# Patient Record
Sex: MALE | Race: WHITE | NOT HISPANIC OR LATINO
[De-identification: names, ages, dates, MRNs, and addresses within clinical notes are randomized per-mention and may not be internally consistent; named-entity substitution may affect disease eponyms.]

---

## 2019-04-15 ENCOUNTER — APPOINTMENT (OUTPATIENT)
Dept: RHEUMATOLOGY | Facility: CLINIC | Age: 59
End: 2019-04-15
Payer: COMMERCIAL

## 2019-04-15 VITALS
WEIGHT: 200 LBS | TEMPERATURE: 98.7 F | SYSTOLIC BLOOD PRESSURE: 130 MMHG | DIASTOLIC BLOOD PRESSURE: 78 MMHG | HEART RATE: 79 BPM | OXYGEN SATURATION: 98 % | HEIGHT: 69 IN | BODY MASS INDEX: 29.62 KG/M2 | RESPIRATION RATE: 17 BRPM

## 2019-04-15 DIAGNOSIS — Z82.61 FAMILY HISTORY OF ARTHRITIS: ICD-10-CM

## 2019-04-15 DIAGNOSIS — Z87.891 PERSONAL HISTORY OF NICOTINE DEPENDENCE: ICD-10-CM

## 2019-04-15 DIAGNOSIS — Z86.79 PERSONAL HISTORY OF OTHER DISEASES OF THE CIRCULATORY SYSTEM: ICD-10-CM

## 2019-04-15 PROBLEM — Z00.00 ENCOUNTER FOR PREVENTIVE HEALTH EXAMINATION: Status: ACTIVE | Noted: 2019-04-15

## 2019-04-15 PROCEDURE — 99205 OFFICE O/P NEW HI 60 MIN: CPT

## 2019-04-15 RX ORDER — LOSARTAN POTASSIUM 25 MG/1
25 TABLET, FILM COATED ORAL
Qty: 30 | Refills: 0 | Status: COMPLETED | COMMUNITY
Start: 2018-12-07

## 2019-04-15 RX ORDER — LOSARTAN POTASSIUM 100 MG/1
100 TABLET, FILM COATED ORAL
Qty: 90 | Refills: 0 | Status: ACTIVE | COMMUNITY
Start: 2019-04-05

## 2019-04-15 RX ORDER — VILAZODONE HYDROCHLORIDE 10 MG/1
10 TABLET ORAL
Qty: 90 | Refills: 0 | Status: ACTIVE | COMMUNITY
Start: 2019-03-22

## 2019-04-15 RX ORDER — ZOLPIDEM TARTRATE 5 MG/1
5 TABLET ORAL
Qty: 30 | Refills: 0 | Status: ACTIVE | COMMUNITY
Start: 2019-03-22

## 2019-04-15 RX ORDER — LOSARTAN POTASSIUM 50 MG/1
50 TABLET, FILM COATED ORAL
Qty: 30 | Refills: 0 | Status: COMPLETED | COMMUNITY
Start: 2019-01-04

## 2019-04-15 RX ORDER — AMLODIPINE BESYLATE 10 MG/1
10 TABLET ORAL
Qty: 30 | Refills: 0 | Status: ACTIVE | COMMUNITY
Start: 2018-10-18

## 2019-04-15 NOTE — HISTORY OF PRESENT ILLNESS
[FreeTextEntry1] : 58 year old male with PMHx as listed below reports that for the past 6-8 months, he has been experiencing pain and stiffness in his neck.  The pain is constant, though worse with activity and better at rest.  He describes the pain as dull, 5 out of 10.  He denies any radiation.   No weakness/numbness/tingling in any of his extremities.   No AM stiffness.  He gets relief from ibuprofen.\par Soon after the neck pain, he began to experience blurry vision and frequent headaches.  He initially assumed that he was having sinus headaches.  However, the symptoms worsened, and he began to feel like his eyes were "straining".  He saw his PMD, who referred him to ophthalmology, who diagnosed him with iritis.  He was started on prednisolone eye drops and was referred here.\par No F/C, no unintentional weight loss, no night sweats, no oral ulcers, no rashes, no joint pains, no alopecia, no photosensitivity, no dry eyes/dry mouth, no Raynaud symptoms, no focal weakness, no dysphagia\par No personal or family history of psoriasis, no hx of dactylitis, no back pain, no IBD symptoms, no preceding GI/ infection\par \par  [Weight Loss] : no weight loss [Anorexia] : no anorexia [Malaise] : no malaise [Fever] : no fever [Malar Facial Rash] : no malar facial rash [Chills] : no chills [Skin Nodules] : no skin nodules [Skin Lesions] : no lesions [Oral Ulcers] : no oral ulcers [Dry Mouth] : no dry mouth [Cough] : no cough [Dysphagia] : no dysphagia [Shortness of Breath] : no shortness of breath [Chest Pain] : no chest pain [Arthralgias] : no arthralgias [Joint Warmth] : no joint warmth [Joint Swelling] : no joint swelling [Joint Deformity] : no joint deformity [Decreased ROM] : no decreased range of motion [Falls] : no falls [Morning Stiffness] : no morning stiffness [Dyspnea] : no dyspnea [Muscle Weakness] : no muscle weakness [Myalgias] : no myalgias [Muscle Spasms] : no muscle spasms [Muscle Cramping] : no muscle cramping [Eye Pain] : no eye pain [Visual Changes] : no visual changes [Eye Redness] : no eye redness [Dry Eyes] : no dry eyes

## 2019-04-15 NOTE — CONSULT LETTER
[Dear  ___] : Dear  [unfilled], [Please see my note below.] : Please see my note below. [Consult Letter:] : I had the pleasure of evaluating your patient, [unfilled]. [Consult Closing:] : Thank you very much for allowing me to participate in the care of this patient.  If you have any questions, please do not hesitate to contact me. [Sincerely,] : Sincerely, [FreeTextEntry3] : Kendall Anderson MD\par Rheumatology\par [facility]\par  of Medicine\par [employer] [facility] \par \par [address]\[place] [address]\par phone:  [phone]\par fax:      [phone]\par \par [address] [place] [address]\[place] [address]\par phone:  [phone]\par fax:      [phone]\par

## 2019-04-15 NOTE — ASSESSMENT
[FreeTextEntry1] : 58 year old male was recently diagnosed with B/L iritis.  While iritis can often be due to underlying connective tissue disorders, including spondyloarthropathies, sarcoidosis, and vasculitis, he does not exhibit any obvious signs/symptoms of any of these.  His only current complaint is neck pain of unclear etiology.  I have therefore ordered some more bloodwork and x-rays as further workup.  He will follow up with me again in 2-3 weeks to review his results.  In the meantime, he will continue taking prednisolone eye drops, as per ophthalmology, and ibuprofen prn for the neck pain.\par \par

## 2019-04-15 NOTE — PHYSICAL EXAM
[General Appearance - Alert] : alert [Sclera] : the sclera and conjunctiva were normal [General Appearance - In No Acute Distress] : in no acute distress [Outer Ear] : the ears and nose were normal in appearance [Oropharynx] : the oropharynx was normal [Jugular Venous Distention Increased] : there was no jugular-venous distention [Neck Cervical Mass (___cm)] : no neck mass was observed [Neck Appearance] : the appearance of the neck was normal [Thyroid Nodule] : there were no palpable thyroid nodules [Thyroid Diffuse Enlargement] : the thyroid was not enlarged [Auscultation Breath Sounds / Voice Sounds] : lungs were clear to auscultation bilaterally [Heart Sounds] : normal S1 and S2 [Heart Rate And Rhythm] : heart rate was normal and rhythm regular [Murmurs] : no murmurs [Heart Sounds Pericardial Friction Rub] : no pericardial rub [Heart Sounds Gallop] : no gallops [Edema] : there was no peripheral edema [Bowel Sounds] : normal bowel sounds [Abdomen Tenderness] : non-tender [Abdomen Soft] : soft [Cervical Lymph Nodes Enlarged Posterior Bilaterally] : posterior cervical [Abdomen Mass (___ Cm)] : no abdominal mass palpated [Cervical Lymph Nodes Enlarged Anterior Bilaterally] : anterior cervical [Supraclavicular Lymph Nodes Enlarged Bilaterally] : supraclavicular [No Spinal Tenderness] : no spinal tenderness [Skin Color & Pigmentation] : normal skin color and pigmentation [Skin Turgor] : normal skin turgor [] : no rash [No Focal Deficits] : no focal deficits [Oriented To Time, Place, And Person] : oriented to person, place, and time [Affect] : the affect was normal [Impaired Insight] : insight and judgment were intact [FreeTextEntry1] : No synovitis, full ROM in all joints;  (+)pain in upper left-sided neck with flexion/extension, right rotation and right lateral bending\par

## 2019-07-30 ENCOUNTER — APPOINTMENT (OUTPATIENT)
Dept: RHEUMATOLOGY | Facility: CLINIC | Age: 59
End: 2019-07-30
Payer: COMMERCIAL

## 2019-07-30 VITALS
RESPIRATION RATE: 17 BRPM | HEIGHT: 69 IN | TEMPERATURE: 98.2 F | OXYGEN SATURATION: 97 % | BODY MASS INDEX: 29.62 KG/M2 | HEART RATE: 77 BPM | WEIGHT: 200 LBS | DIASTOLIC BLOOD PRESSURE: 78 MMHG | SYSTOLIC BLOOD PRESSURE: 120 MMHG

## 2019-07-30 PROCEDURE — 99214 OFFICE O/P EST MOD 30 MIN: CPT

## 2019-07-30 NOTE — DATA REVIEWED
[FreeTextEntry1] : CBC, CMP unremarkable\par MANISH negative\par ANCA negative \par (+)HLA-B27\par ESR 2\par CRP 0.9mg/L\par ACE 41\par

## 2019-07-30 NOTE — HISTORY OF PRESENT ILLNESS
[FreeTextEntry1] : 58 year old male with PMHx as listed below reports that for the past 6-8 months, he has been experiencing pain and stiffness in his neck.  The pain is constant, though worse with activity and better at rest.  He describes the pain as dull, 5 out of 10.  He denies any radiation.   No weakness/numbness/tingling in any of his extremities.   No AM stiffness.  He gets relief from ibuprofen.\par Soon after the neck pain, he began to experience blurry vision and frequent headaches.  He initially assumed that he was having sinus headaches.  However, the symptoms worsened, and he began to feel like his eyes were "straining".  He saw his PMD, who referred him to ophthalmology, who diagnosed him with iritis.  He was started on prednisolone eye drops and was referred here.\par No F/C, no unintentional weight loss, no night sweats, no oral ulcers, no rashes, no joint pains, no alopecia, no photosensitivity, no dry eyes/dry mouth, no Raynaud symptoms, no focal weakness, no dysphagia\par No personal or family history of psoriasis, no hx of dactylitis, no back pain, no IBD symptoms, no preceding GI/ infection\par \par  [Anorexia] : no anorexia [Weight Loss] : no weight loss [Malaise] : no malaise [Fever] : no fever [Chills] : no chills [Malar Facial Rash] : no malar facial rash [Skin Lesions] : no lesions [Oral Ulcers] : no oral ulcers [Skin Nodules] : no skin nodules [Dry Mouth] : no dry mouth [Cough] : no cough [Shortness of Breath] : no shortness of breath [Dysphagia] : no dysphagia [Chest Pain] : no chest pain [Joint Swelling] : no joint swelling [Arthralgias] : no arthralgias [Joint Warmth] : no joint warmth [Joint Deformity] : no joint deformity [Decreased ROM] : no decreased range of motion [Morning Stiffness] : no morning stiffness [Myalgias] : no myalgias [Falls] : no falls [Dyspnea] : no dyspnea [Muscle Weakness] : no muscle weakness [Muscle Spasms] : no muscle spasms [Muscle Cramping] : no muscle cramping [Visual Changes] : no visual changes [Eye Redness] : no eye redness [Eye Pain] : no eye pain [Dry Eyes] : no dry eyes

## 2019-07-30 NOTE — ASSESSMENT
[FreeTextEntry1] : 58 year old male with:\par 1)   Recent episode of iritis:  resolved w/ prednisolone drops.  w/u reveals (+)HLA-B27, though no other si/sx of spondyloarthropathy.\par   - Cont to monitor for now.\par   - If iritis recurs, may need steroid-sparing agent.\par 2) Neck pain, headaches:  unclear etiology.\par   - x-rays of c-spine.\par   - ibuprofen and/or Tylenol prn.\par 3)  Pain in B/L 1st CMC's:  most c/w OA.\par   - Reiterated importance of exercise\par   - ibuprofen and/or Tylenol prn\par   - warm compresses\par   - OTC topical analgesics\par   - x-rays of hands.

## 2019-07-30 NOTE — PHYSICAL EXAM
[General Appearance - Alert] : alert [General Appearance - In No Acute Distress] : in no acute distress [Sclera] : the sclera and conjunctiva were normal [Oropharynx] : the oropharynx was normal [Outer Ear] : the ears and nose were normal in appearance [Jugular Venous Distention Increased] : there was no jugular-venous distention [Neck Appearance] : the appearance of the neck was normal [Neck Cervical Mass (___cm)] : no neck mass was observed [Thyroid Nodule] : there were no palpable thyroid nodules [Thyroid Diffuse Enlargement] : the thyroid was not enlarged [Heart Rate And Rhythm] : heart rate was normal and rhythm regular [Auscultation Breath Sounds / Voice Sounds] : lungs were clear to auscultation bilaterally [Heart Sounds] : normal S1 and S2 [Heart Sounds Gallop] : no gallops [Murmurs] : no murmurs [Edema] : there was no peripheral edema [Heart Sounds Pericardial Friction Rub] : no pericardial rub [Bowel Sounds] : normal bowel sounds [Abdomen Soft] : soft [Abdomen Tenderness] : non-tender [Abdomen Mass (___ Cm)] : no abdominal mass palpated [Cervical Lymph Nodes Enlarged Anterior Bilaterally] : anterior cervical [Cervical Lymph Nodes Enlarged Posterior Bilaterally] : posterior cervical [Supraclavicular Lymph Nodes Enlarged Bilaterally] : supraclavicular [No Spinal Tenderness] : no spinal tenderness [Skin Color & Pigmentation] : normal skin color and pigmentation [Skin Turgor] : normal skin turgor [] : no rash [No Focal Deficits] : no focal deficits [Oriented To Time, Place, And Person] : oriented to person, place, and time [Impaired Insight] : insight and judgment were intact [Affect] : the affect was normal [FreeTextEntry1] : No synovitis, full ROM in all joints;  (+)pain in upper left-sided neck with flexion/extension, right rotation and right lateral bending\par

## 2019-11-07 ENCOUNTER — APPOINTMENT (OUTPATIENT)
Dept: RHEUMATOLOGY | Facility: CLINIC | Age: 59
End: 2019-11-07

## 2022-03-28 ENCOUNTER — APPOINTMENT (OUTPATIENT)
Dept: CARDIOLOGY | Facility: CLINIC | Age: 62
End: 2022-03-28
Payer: MEDICARE

## 2022-03-28 ENCOUNTER — NON-APPOINTMENT (OUTPATIENT)
Age: 62
End: 2022-03-28

## 2022-03-28 VITALS — DIASTOLIC BLOOD PRESSURE: 70 MMHG | SYSTOLIC BLOOD PRESSURE: 120 MMHG

## 2022-03-28 VITALS
TEMPERATURE: 97.2 F | HEIGHT: 69 IN | SYSTOLIC BLOOD PRESSURE: 124 MMHG | WEIGHT: 190 LBS | OXYGEN SATURATION: 96 % | BODY MASS INDEX: 28.14 KG/M2 | HEART RATE: 93 BPM | DIASTOLIC BLOOD PRESSURE: 78 MMHG

## 2022-03-28 DIAGNOSIS — M79.645 PAIN IN RIGHT FINGER(S): ICD-10-CM

## 2022-03-28 DIAGNOSIS — H20.9 UNSPECIFIED IRIDOCYCLITIS: ICD-10-CM

## 2022-03-28 DIAGNOSIS — M79.644 PAIN IN RIGHT FINGER(S): ICD-10-CM

## 2022-03-28 DIAGNOSIS — Z87.898 PERSONAL HISTORY OF OTHER SPECIFIED CONDITIONS: ICD-10-CM

## 2022-03-28 DIAGNOSIS — Z87.39 PERSONAL HISTORY OF OTHER DISEASES OF THE MUSCULOSKELETAL SYSTEM AND CONNECTIVE TISSUE: ICD-10-CM

## 2022-03-28 PROCEDURE — 93000 ELECTROCARDIOGRAM COMPLETE: CPT

## 2022-03-28 PROCEDURE — 99205 OFFICE O/P NEW HI 60 MIN: CPT

## 2022-03-28 NOTE — ASSESSMENT
[FreeTextEntry1] : Shortness of breath -I recommended echocardiogram for LV size, wall motion, LVEF; I also recommend ETT to see any inducible symptoms/CAD evaluation.\par \par Carotid bruit -carotid Doppler.\par \par Dyslipidemia -low-cholesterol diet has been discussed with him at length.\par \par Aggressive risk factor modification discussed with him.  He will be reevaluated by me after cardiac testing

## 2022-03-28 NOTE — HISTORY OF PRESENT ILLNESS
[FreeTextEntry1] : 61 years old gentleman with history of hypertension coming came for cardiac evaluation.  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  Denies PND, orthopnea, diaphoresis, dizziness, palpitations, pedal edema.\par \par He has few symptoms suggestive of sleep apnea syndrome.

## 2022-05-03 ENCOUNTER — APPOINTMENT (OUTPATIENT)
Dept: CARDIOLOGY | Facility: CLINIC | Age: 62
End: 2022-05-03
Payer: MEDICARE

## 2022-05-03 PROCEDURE — 93306 TTE W/DOPPLER COMPLETE: CPT

## 2022-05-03 PROCEDURE — 93880 EXTRACRANIAL BILAT STUDY: CPT

## 2022-06-02 ENCOUNTER — APPOINTMENT (OUTPATIENT)
Dept: CARDIOLOGY | Facility: CLINIC | Age: 62
End: 2022-06-02
Payer: MEDICARE

## 2022-06-02 PROCEDURE — 93015 CV STRESS TEST SUPVJ I&R: CPT

## 2022-06-13 ENCOUNTER — APPOINTMENT (OUTPATIENT)
Dept: CARDIOLOGY | Facility: CLINIC | Age: 62
End: 2022-06-13
Payer: MEDICARE

## 2022-06-13 VITALS
DIASTOLIC BLOOD PRESSURE: 70 MMHG | HEART RATE: 97 BPM | OXYGEN SATURATION: 97 % | HEIGHT: 69 IN | WEIGHT: 190 LBS | BODY MASS INDEX: 28.14 KG/M2 | SYSTOLIC BLOOD PRESSURE: 122 MMHG

## 2022-06-13 DIAGNOSIS — R06.02 SHORTNESS OF BREATH: ICD-10-CM

## 2022-06-13 PROCEDURE — 99214 OFFICE O/P EST MOD 30 MIN: CPT

## 2022-06-13 RX ORDER — ROSUVASTATIN CALCIUM 5 MG/1
5 TABLET, FILM COATED ORAL
Qty: 90 | Refills: 0 | Status: ACTIVE | COMMUNITY
Start: 2022-02-10

## 2022-06-13 NOTE — PHYSICAL EXAM
[No Gallop] : no gallop [Normal] : moves all extremities, no focal deficits, normal speech [de-identified] : No carotid bruits auscultated bilaterally

## 2022-06-13 NOTE — DISCUSSION/SUMMARY
[FreeTextEntry1] : 1. Shortness Breath: normal ETT and echocardiogram. \par \par 2. Hypercholesterolemia: reviewed labs from patient from February 2022 and May 2022. 10 Year ASCVD Risk score is 9-10%. Discussed indications and benefits of statin therapy. Patient would like to be further risk stratified. Recommend CT of heart for calcium scoring. \par \par 3. HTN: controlled. Goal BP less than 130/80. Continue amlodipine 10mg daily and losartan 100mg daily. Low salt diet recommended. \par \par Follow up in 1 year.

## 2022-06-13 NOTE — HISTORY OF PRESENT ILLNESS
[FreeTextEntry1] : 61 years old gentleman with history of hypertension coming came for cardiac evaluation.  He is a retired  worked at [employer] during [employer]. \par \par He has history of bilateral iritis, improved symptoms with prednisone drops, now he does not  follow with rheumatologist.\par \par He gets short of breath easily more than usual exertion but no chest pain.  Denies PND, orthopnea, diaphoresis, dizziness, palpitations, pedal edema.\par \par

## 2022-06-13 NOTE — CARDIOLOGY SUMMARY
[de-identified] : 3/28/2022, NSR, normal ECG [de-identified] : 6/2/2022, Plain Treadmill Stress Test: Exercised for 12 minutes utilizing Standard Khris Protocol. No chest pain or abnormal dyspnea. No ECG changes to suggest ischemia.\par  [de-identified] : 5/3/2022, LV EF 60-65%, MAC with trace MR, sclerotic AV. normal LV diastolic function, trace TR. [de-identified] : 5/3/2022,, Carotid Duplex: Mild intimal thickening bilaterally.

## 2022-06-30 ENCOUNTER — NON-APPOINTMENT (OUTPATIENT)
Age: 62
End: 2022-06-30

## 2023-07-07 ENCOUNTER — NON-APPOINTMENT (OUTPATIENT)
Age: 63
End: 2023-07-07

## 2023-07-07 ENCOUNTER — APPOINTMENT (OUTPATIENT)
Dept: CARDIOLOGY | Facility: CLINIC | Age: 63
End: 2023-07-07
Payer: MEDICARE

## 2023-07-07 VITALS
HEART RATE: 92 BPM | HEIGHT: 69 IN | OXYGEN SATURATION: 95 % | WEIGHT: 195 LBS | BODY MASS INDEX: 28.88 KG/M2 | SYSTOLIC BLOOD PRESSURE: 132 MMHG | DIASTOLIC BLOOD PRESSURE: 82 MMHG

## 2023-07-07 DIAGNOSIS — E78.5 HYPERLIPIDEMIA, UNSPECIFIED: ICD-10-CM

## 2023-07-07 DIAGNOSIS — I10 ESSENTIAL (PRIMARY) HYPERTENSION: ICD-10-CM

## 2023-07-07 DIAGNOSIS — I25.10 ATHEROSCLEROTIC HEART DISEASE OF NATIVE CORONARY ARTERY W/OUT ANGINA PECTORIS: ICD-10-CM

## 2023-07-07 DIAGNOSIS — I77.810 THORACIC AORTIC ECTASIA: ICD-10-CM

## 2023-07-07 PROCEDURE — 99214 OFFICE O/P EST MOD 30 MIN: CPT | Mod: 25

## 2023-07-07 PROCEDURE — 93000 ELECTROCARDIOGRAM COMPLETE: CPT

## 2023-07-07 RX ORDER — ASPIRIN ENTERIC COATED TABLETS 81 MG 81 MG/1
81 TABLET, DELAYED RELEASE ORAL
Refills: 0 | Status: ACTIVE | COMMUNITY
Start: 2023-07-07

## 2023-07-07 RX ORDER — LAMOTRIGINE 25 MG/1
25 TABLET ORAL
Refills: 0 | Status: ACTIVE | COMMUNITY

## 2023-07-07 NOTE — CARDIOLOGY SUMMARY
[de-identified] : 7/7/2023, NSR, normal ECG\par 3/28/2022, NSR, normal ECG [de-identified] : 6/2/2022, Plain Treadmill Stress Test: Exercised for 12 minutes utilizing Standard Khris Protocol. No chest pain or abnormal dyspnea. No ECG changes to suggest ischemia.\par  [de-identified] : 5/3/2022, LV EF 60-65%, MAC with trace MR, sclerotic AV. normal LV diastolic function, trace TR. [de-identified] : 6/29/2023, total calcium score 140 and ascending aorta 4.0cm. [de-identified] : 5/3/2022,, Carotid Duplex: Mild intimal thickening bilaterally.

## 2023-07-07 NOTE — HISTORY OF PRESENT ILLNESS
[FreeTextEntry1] : Historical Perspective:\par 63 years old gentleman with history of hypertension coming came for cardiac evaluation.  He is a retired  worked at [employer] during [employer]. \par \par He has history of bilateral iritis, improved symptoms with prednisone drops, now he does not  follow with rheumatologist.\par \par He gets short of breath easily more than usual exertion but no chest pain.  Denies PND, orthopnea, diaphoresis, dizziness, palpitations, pedal edema.\par \par Current Health Status:\par Patient with no chest pain, SOB, or palpitations. No hospitalizations since seeing me last. Remains compliant with his medications and reports no adverse effects.\par \par

## 2023-07-07 NOTE — PHYSICAL EXAM
[No Gallop] : no gallop [Normal] : moves all extremities, no focal deficits, normal speech [de-identified] : No carotid bruits auscultated bilaterally

## 2023-07-07 NOTE — DISCUSSION/SUMMARY
[FreeTextEntry1] : 1. Shortness Breath: normal ETT and echocardiogram. \par \par 2. Hypercholesterolemia: continue rosuvastatin 5mg daily.\par \par 3. HTN: controlled. Goal BP less than 130/80. Continue amlodipine 10mg daily and losartan 100mg daily. Low salt diet recommended. \par \par 4. Coronary Artery Disease: total calcium score 140, 6/29/2023. Continue rosuvastatin 5mg daily and Aspirin 81mg daily. \par \par 5. Dilated Ascending Aorta: non-contrast CT demonstrated 4.0cm. Yearly echocardiogram follow up .\par \par Follow up in 1 year.  [EKG obtained to assist in diagnosis and management of assessed problem(s)] : EKG obtained to assist in diagnosis and management of assessed problem(s)

## 2023-07-27 ENCOUNTER — APPOINTMENT (OUTPATIENT)
Dept: CARDIOLOGY | Facility: CLINIC | Age: 63
End: 2023-07-27
Payer: MEDICARE

## 2023-07-27 PROCEDURE — 93306 TTE W/DOPPLER COMPLETE: CPT

## 2024-07-23 ENCOUNTER — NON-APPOINTMENT (OUTPATIENT)
Age: 64
End: 2024-07-23

## 2024-07-23 ENCOUNTER — APPOINTMENT (OUTPATIENT)
Dept: CARDIOLOGY | Facility: CLINIC | Age: 64
End: 2024-07-23
Payer: MEDICARE

## 2024-07-23 VITALS
BODY MASS INDEX: 28.88 KG/M2 | OXYGEN SATURATION: 95 % | WEIGHT: 195 LBS | HEART RATE: 70 BPM | HEIGHT: 69 IN | SYSTOLIC BLOOD PRESSURE: 132 MMHG | DIASTOLIC BLOOD PRESSURE: 82 MMHG

## 2024-07-23 DIAGNOSIS — R06.02 SHORTNESS OF BREATH: ICD-10-CM

## 2024-07-23 DIAGNOSIS — I10 ESSENTIAL (PRIMARY) HYPERTENSION: ICD-10-CM

## 2024-07-23 DIAGNOSIS — I25.10 ATHEROSCLEROTIC HEART DISEASE OF NATIVE CORONARY ARTERY W/OUT ANGINA PECTORIS: ICD-10-CM

## 2024-07-23 DIAGNOSIS — E78.5 HYPERLIPIDEMIA, UNSPECIFIED: ICD-10-CM

## 2024-07-23 DIAGNOSIS — I77.810 THORACIC AORTIC ECTASIA: ICD-10-CM

## 2024-07-23 PROCEDURE — 99214 OFFICE O/P EST MOD 30 MIN: CPT

## 2024-07-23 PROCEDURE — G2211 COMPLEX E/M VISIT ADD ON: CPT

## 2024-07-23 PROCEDURE — 93000 ELECTROCARDIOGRAM COMPLETE: CPT

## 2024-07-23 NOTE — CARDIOLOGY SUMMARY
[de-identified] : 7/7/2023, NSR, normal ECG\par  3/28/2022, NSR, normal ECG [de-identified] : 6/2/2022, Plain Treadmill Stress Test: Exercised for 12 minutes utilizing Standard Khris Protocol. No chest pain or abnormal dyspnea. No ECG changes to suggest ischemia.\par   [de-identified] : 7/27/2023, LV EF 60-65%, normal LV diastolic function, trace MR, trace TR. 5/3/2022, LV EF 60-65%, MAC with trace MR, sclerotic AV. normal LV diastolic function, trace TR. [de-identified] : 6/29/2023, total calcium score 140 and ascending aorta 4.0cm. [de-identified] : 5/3/2022,, Carotid Duplex: Mild intimal thickening bilaterally.

## 2024-07-23 NOTE — PHYSICAL EXAM
[No Gallop] : no gallop [Normal] : moves all extremities, no focal deficits, normal speech [de-identified] : No carotid bruits auscultated bilaterally

## 2024-07-23 NOTE — DISCUSSION/SUMMARY
[FreeTextEntry1] : 1. Shortness Breath: recommend updating echocardiogram and ordering a CTA Coronary Arteries.  2. Hypercholesterolemia: continue rosuvastatin 5mg daily.  3. HTN: controlled. Goal BP less than 130/80. Continue amlodipine 10mg daily and losartan 100mg daily. Low salt diet recommended.   4. Coronary Artery Disease: total calcium score 140, 6/29/2023. Continue rosuvastatin 5mg daily and Aspirin 81mg daily. Given exertional dyspnea recommend CTA Coronary Arteries.  5. Dilated Ascending Aorta: non-contrast CT demonstrated 4.0cm. Yearly echocardiogram follow up.  Follow up in 1 year.  [EKG obtained to assist in diagnosis and management of assessed problem(s)] : EKG obtained to assist in diagnosis and management of assessed problem(s)

## 2024-07-23 NOTE — HISTORY OF PRESENT ILLNESS
Pap and Bx negative  Keep appt   [FreeTextEntry1] : Historical Perspective: 64 years old gentleman with history of hypertension coming came for cardiac evaluation.  He is a retired  worked at [employer] during [employer].   He has history of bilateral iritis, improved symptoms with prednisone drops, now he does not  follow with rheumatologist.  He gets short of breath easily more than usual exertion but no chest pain.  Denies PND, orthopnea, diaphoresis, dizziness, palpitations, pedal edema.  Current Health Status: Patient has noted dyspnea when exercising at the gym. No associated chest pain or pressure. He states this dyspnea is new to him

## 2024-08-23 ENCOUNTER — APPOINTMENT (OUTPATIENT)
Dept: CARDIOLOGY | Facility: CLINIC | Age: 64
End: 2024-08-23
Payer: MEDICARE

## 2024-08-23 PROCEDURE — 93306 TTE W/DOPPLER COMPLETE: CPT
